# Patient Record
Sex: MALE | Race: BLACK OR AFRICAN AMERICAN
[De-identification: names, ages, dates, MRNs, and addresses within clinical notes are randomized per-mention and may not be internally consistent; named-entity substitution may affect disease eponyms.]

---

## 2019-09-14 ENCOUNTER — HOSPITAL ENCOUNTER (EMERGENCY)
Dept: HOSPITAL 62 - ER | Age: 24
LOS: 1 days | Discharge: ANOTHER HOSPITAL | End: 2019-09-15
Payer: SELF-PAY

## 2019-09-14 DIAGNOSIS — R20.2: ICD-10-CM

## 2019-09-14 DIAGNOSIS — R94.31: ICD-10-CM

## 2019-09-14 DIAGNOSIS — R06.02: ICD-10-CM

## 2019-09-14 DIAGNOSIS — R07.89: Primary | ICD-10-CM

## 2019-09-14 DIAGNOSIS — F12.10: ICD-10-CM

## 2019-09-14 DIAGNOSIS — R11.0: ICD-10-CM

## 2019-09-14 DIAGNOSIS — I10: ICD-10-CM

## 2019-09-14 DIAGNOSIS — R79.89: ICD-10-CM

## 2019-09-14 LAB
ADD MANUAL DIFF: NO
ALBUMIN SERPL-MCNC: 4.4 G/DL (ref 3.5–5)
ALP SERPL-CCNC: 79 U/L (ref 38–126)
ANION GAP SERPL CALC-SCNC: 7 MMOL/L (ref 5–19)
AST SERPL-CCNC: 21 U/L (ref 17–59)
BARBITURATES UR QL SCN: NEGATIVE
BASOPHILS # BLD AUTO: 0 10^3/UL (ref 0–0.2)
BASOPHILS NFR BLD AUTO: 1 % (ref 0–2)
BILIRUB DIRECT SERPL-MCNC: 0.1 MG/DL (ref 0–0.4)
BILIRUB SERPL-MCNC: 0.5 MG/DL (ref 0.2–1.3)
BUN SERPL-MCNC: 13 MG/DL (ref 7–20)
CALCIUM: 9.8 MG/DL (ref 8.4–10.2)
CHLORIDE SERPL-SCNC: 102 MMOL/L (ref 98–107)
CO2 SERPL-SCNC: 31 MMOL/L (ref 22–30)
EOSINOPHIL # BLD AUTO: 0.2 10^3/UL (ref 0–0.6)
EOSINOPHIL NFR BLD AUTO: 5.1 % (ref 0–6)
ERYTHROCYTE [DISTWIDTH] IN BLOOD BY AUTOMATED COUNT: 12.6 % (ref 11.5–14)
GLUCOSE SERPL-MCNC: 96 MG/DL (ref 75–110)
HCT VFR BLD CALC: 39.6 % (ref 37.9–51)
HGB BLD-MCNC: 13.7 G/DL (ref 13.5–17)
LYMPHOCYTES # BLD AUTO: 1.7 10^3/UL (ref 0.5–4.7)
LYMPHOCYTES NFR BLD AUTO: 34.3 % (ref 13–45)
MCH RBC QN AUTO: 30.8 PG (ref 27–33.4)
MCHC RBC AUTO-ENTMCNC: 34.5 G/DL (ref 32–36)
MCV RBC AUTO: 89 FL (ref 80–97)
METHADONE UR QL SCN: NEGATIVE
MONOCYTES # BLD AUTO: 0.3 10^3/UL (ref 0.1–1.4)
MONOCYTES NFR BLD AUTO: 7.2 % (ref 3–13)
NEUTROPHILS # BLD AUTO: 2.5 10^3/UL (ref 1.7–8.2)
NEUTS SEG NFR BLD AUTO: 52.4 % (ref 42–78)
PCP UR QL SCN: NEGATIVE
PLATELET # BLD: 213 10^3/UL (ref 150–450)
POTASSIUM SERPL-SCNC: 4 MMOL/L (ref 3.6–5)
PROT SERPL-MCNC: 7.1 G/DL (ref 6.3–8.2)
RBC # BLD AUTO: 4.44 10^6/UL (ref 4.35–5.55)
TOTAL CELLS COUNTED % (AUTO): 100 %
URINE AMPHETAMINES SCREEN: NEGATIVE
URINE BENZODIAZEPINES SCREEN: NEGATIVE
URINE COCAINE SCREEN: NEGATIVE
URINE MARIJUANA (THC) SCREEN: (no result)
WBC # BLD AUTO: 4.8 10^3/UL (ref 4–10.5)

## 2019-09-14 PROCEDURE — 85025 COMPLETE CBC W/AUTO DIFF WBC: CPT

## 2019-09-14 PROCEDURE — 36415 COLL VENOUS BLD VENIPUNCTURE: CPT

## 2019-09-14 PROCEDURE — 80307 DRUG TEST PRSMV CHEM ANLYZR: CPT

## 2019-09-14 PROCEDURE — 84484 ASSAY OF TROPONIN QUANT: CPT

## 2019-09-14 PROCEDURE — 71046 X-RAY EXAM CHEST 2 VIEWS: CPT

## 2019-09-14 PROCEDURE — 93010 ELECTROCARDIOGRAM REPORT: CPT

## 2019-09-14 PROCEDURE — 93005 ELECTROCARDIOGRAM TRACING: CPT

## 2019-09-14 PROCEDURE — 80053 COMPREHEN METABOLIC PANEL: CPT

## 2019-09-14 PROCEDURE — 99285 EMERGENCY DEPT VISIT HI MDM: CPT

## 2019-09-14 RX ADMIN — NITROGLYCERIN PRN TAB: 0.4 TABLET SUBLINGUAL at 22:25

## 2019-09-14 RX ADMIN — NITROGLYCERIN PRN TAB: 0.4 TABLET SUBLINGUAL at 22:06

## 2019-09-14 NOTE — RADIOLOGY REPORT (SQ)
EXAM DESCRIPTION:

XR CHEST 2 VIEWS



COMPLETED DATE/TME:  09/14/2019 22:52



CLINICAL HISTORY:

24 years Male, chest pain



COMPARISON:

None.



NUMBER OF VIEWS/TECHNIQUE:

2, Frontal, Lateral



FINDINGS:



Adequate lung volume, clear parenchyma, normal cardiac

silhouette, and intact bony thorax.



IMPRESSION:



No acute cardiopulmonary findings.

## 2019-09-14 NOTE — ER DOCUMENT REPORT
ED Cardiac





- General


Chief Complaint: Chest Pressure


Stated Complaint: CHEST PAIN


Time Seen by Provider: 09/14/19 21:34


Primary Care Provider: 


RAMON MONTERO [NO LOCAL MD] - Follow up as needed


Mode of Arrival: Ambulatory


Information source: Patient


Notes: 





Patient is an otherwise healthy 24-year-old male presenting to the emergency 

department chief complaint of chest tightness that initially began 3 weeks ago. 

He reports it is an intermittent tightness and is not associated with any 

activity.  Today after playing basketball he experience another episode of the 

chest tightness with radiation straight through to his back in approximately 6 

PM.  He also reported some altered sensation in the right arm and right neck, he

stated it felt like a pins and needle sensation.  He reports associated nausea 

and shortness of breath with episodes of chest pressure.  He has no past medical

history, he does not take any daily medications, he does report a history of an 

umbilical hernia repair several years ago.  He is a non-smoker but reports use 

of marijuana.


TRAVEL OUTSIDE OF THE [place] IN LAST 30 DAYS: No





- Related Data


Allergies/Adverse Reactions: 


                                        





No Known Allergies Allergy (Unverified 01/18/12 17:09)


   











Past Medical History





- General


Information source: Patient





- Social History


Smoking Status: Never Smoker


Frequency of alcohol use: Occasional


Drug Abuse: Marijuana.  denies: Cocaine


Lives with: Family


Family History: Reviewed & Not Pertinent





- Medical History


Medical History: Negative


Past Surgical History: Reports: Other - Umbilical hernia repair





- Immunizations


Immunizations up to date: Yes


Hx Diphtheria, Pertussis, Tetanus Vaccination: Yes





Review of Systems





- Review of Systems


Constitutional: No symptoms reported


EENT: No symptoms reported


Cardiovascular: Chest pain.  denies: Palpitations


Respiratory: Short of breath


Gastrointestinal: Nausea


Genitourinary: No symptoms reported


Male Genitourinary: No symptoms reported


Musculoskeletal: See HPI


Skin: No symptoms reported


Hematologic/Lymphatic: No symptoms reported


Neurological/Psychological: No symptoms reported





Physical Exam





- Vital signs


Vitals: 


                                        











Resp


 


 11 L


 


 09/14/19 21:37














- Notes


Notes: 





PHYSICAL EXAMINATION:





GENERAL: Well-appearing, well-nourished and in no acute distress.





HEAD: Atraumatic, normocephalic.





EYES: Pupils equal round and reactive to light, extraocular movements intact, 

sclera anicteric, conjunctiva are normal.





ENT: Nares patent, oropharynx clear without exudates.  Moist mucous membranes.





NECK: Normal range of motion, supple without lymphadenopathy





LUNGS: Breath sounds clear to auscultation bilaterally and equal.  No wheezes 

rales or rhonchi.





HEART: Regular rate and rhythm without murmurs





ABDOMEN: Soft, nontender, nondistended abdomen.  No guarding, no rebound.  No 

masses appreciated.





Musculoskeletal: Normal range of motion, no pitting or edema.  No cyanosis.





NEUROLOGICAL: Cranial nerves grossly intact.  Normal speech, normal gait.  

Normal sensory, motor exams 





PSYCH: Normal mood, normal affect.





SKIN: Warm, Dry, normal turgor, no rashes or lesions noted.





Course





- Re-evaluation


Re-evalutation: 





09/14/19 21:54


Patient appears well, nontoxic is alert, oriented and in no acute distress.  

Patient's EKG shows a sinus rhythm, rate of 67 with some anterior ST elevation, 

most likely LVH however there is no comparison on file.  He does have a 

reasonable story with his chest pain however he has no cardiac history and no 

cardiac risk factors.  Due to his abnormal EKG we will proceed at this time with

a full cardiac work-up.  Patient's physical examination is unremarkable, he is 

laughing and smiling during my exam although he does report a chest pressure of 

4/5.





09/14/19 21:57


Nitroglycerin ordered, nursing staff will monitor patient's chest pain level.  

We will also inquire again about any cocaine use, patient denied this to me but 

his mother was present in the room.





09/14/19 22:55


Drug screen is negative.  First troponin is mildly elevated at 0.032.  Repeat 

EKG ordered.  Patient does report improvement of his pain after 2 doses of 

nitroglycerin.  He states he still feels a slight pressure but it has decreased 

from a 4/5 down to a 2/5.  I did discuss the case with my attending physician.  

We will continue to monitor patient and will draw a repeat troponin.  Patient 

was initially hypertensive on arrival with a blood pressure systolically 160.  

Patient denies any history of hypertension but as I did look through his chart 

he was seen here in 2012 and also had hypertension at that time.  He has never 

been formally diagnosed with hypertension.





09/15/19 02:25


Repeat troponin came back at 0.041 which is elevated from previous.  I did call 

our cardiologist, Dr. Nugent as patient is currently chest pain-free.  He 

does believe this patient needs to be transferred to a tertiary facility for 

possible cardiac cath.  Call was then placed to [facility].  Currently awaiting callback.  Again patient is currently chest pain-

free, his blood pressure is 130/74, he is breathing 11 times per minute, his 

pulse ox is 99% on room air and his heart rate is ranging between 49-56.





09/15/19 03:23


Patient accepted for transfer to [facility].  I spoke 

with Dr. Martinez who is excepting the patient on behalf of Dr. Vega Whitaker.  No 

additional orders were requested at this time.





09/15/19 05:50


Patient reevaluated at this time and is stable for transport.  Transport 

expected to be here in 10 minutes.





There is a delay in transport, nursing staff working on securing transport.  

Patient remained stable, vital signs within normal limits.





09/15/19 08:13


Transport is at bedside to transport patient to [facility].  Patient is chest pain-free at this time.  Patient stable for transport.








- Vital Signs


Vital signs: 


                                        











Temp Pulse Resp BP Pulse Ox


 


 98.5 F      14   140/83 H  100 


 


 09/15/19 08:00     09/15/19 08:00  09/15/19 08:00  09/15/19 08:00














- Laboratory


Result Diagrams: 


                                 09/14/19 21:40





                                 09/14/19 21:40


Laboratory results interpreted by me: 


                                        











  09/14/19





  21:40


 


Carbon Dioxide  31 H














Discharge





- Discharge


Clinical Impression: 


 Elevated troponin, Abnormal EKG





Chest pain


Qualifiers:


 Chest pain type: unspecified Qualified Code(s): R07.9 - Chest pain, unspecified





Condition: Stable


Disposition: [facility]


Forms:  Treatment of Relative/Child


Referrals: 


LOCALMD,NO [NO LOCAL MD] - Follow up as needed

## 2019-09-15 VITALS — DIASTOLIC BLOOD PRESSURE: 83 MMHG | SYSTOLIC BLOOD PRESSURE: 140 MMHG

## 2019-09-15 NOTE — EKG REPORT
SEVERITY:- ABNORMAL ECG -

SINUS BRADYCARDIA

CONSIDER LEFT VENTRICULAR HYPERTROPHY

ABNORMAL T, PROBABLE ISCHEMIA, ANT-LAT LEADS

ANTERIOR ST ELEVATION, PROBABLY DUE TO LVH

:

Confirmed by: Jaz Nugent MD 15-Sep-2019 10:22:52

## 2019-09-15 NOTE — EKG REPORT
SEVERITY:- ABNORMAL ECG -

SINUS RHYTHM

ABNORMAL T, PROBABLE ISCHEMIA, INFERIOR LEADS

BORDERLINE ST ELEVATION, ANTERIOR LEADS

PROBABLE LVH W/ SECONDARY REPOL ABNORMALITIES

:

Confirmed by: Jaz Nugent MD 15-Sep-2019 10:24:08

## 2019-09-15 NOTE — EKG REPORT
SEVERITY:- ABNORMAL ECG -

SINUS RHYTHM

ATRIAL PREMATURE COMPLEX

PROBABLE LVH WITH SECONDARY REPOL ABNRM

ABNORMAL T, PROBABLE ISCHEMIA, ANT-LAT LEADS

ANTERIOR ST ELEVATION, PROBABLY DUE TO LVH

:

Confirmed by: Jaz Nugent MD 15-Sep-2019 10:24:14

## 2020-07-17 ENCOUNTER — HOSPITAL ENCOUNTER (EMERGENCY)
Dept: HOSPITAL 62 - ER | Age: 25
Discharge: HOME | End: 2020-07-17
Payer: COMMERCIAL

## 2020-07-17 VITALS — SYSTOLIC BLOOD PRESSURE: 152 MMHG | DIASTOLIC BLOOD PRESSURE: 87 MMHG

## 2020-07-17 DIAGNOSIS — R06.02: Primary | ICD-10-CM

## 2020-07-17 DIAGNOSIS — R07.89: ICD-10-CM

## 2020-07-17 LAB
ADD MANUAL DIFF: NO
ALBUMIN SERPL-MCNC: 4.5 G/DL (ref 3.5–5)
ALP SERPL-CCNC: 98 U/L (ref 38–126)
ANION GAP SERPL CALC-SCNC: 8 MMOL/L (ref 5–19)
AST SERPL-CCNC: 30 U/L (ref 17–59)
BASOPHILS # BLD AUTO: 0.1 10^3/UL (ref 0–0.2)
BASOPHILS NFR BLD AUTO: 1.1 % (ref 0–2)
BILIRUB DIRECT SERPL-MCNC: 0 MG/DL (ref 0–0.4)
BILIRUB SERPL-MCNC: 0.5 MG/DL (ref 0.2–1.3)
BUN SERPL-MCNC: 14 MG/DL (ref 7–20)
CALCIUM: 9.6 MG/DL (ref 8.4–10.2)
CHLORIDE SERPL-SCNC: 102 MMOL/L (ref 98–107)
CK MB SERPL-MCNC: 0.94 NG/ML (ref ?–4.55)
CK SERPL-CCNC: 240 U/L (ref 55–170)
CO2 SERPL-SCNC: 27 MMOL/L (ref 22–30)
EOSINOPHIL # BLD AUTO: 0.3 10^3/UL (ref 0–0.6)
EOSINOPHIL NFR BLD AUTO: 6.6 % (ref 0–6)
ERYTHROCYTE [DISTWIDTH] IN BLOOD BY AUTOMATED COUNT: 12.6 % (ref 11.5–14)
GLUCOSE SERPL-MCNC: 105 MG/DL (ref 75–110)
HCT VFR BLD CALC: 40.5 % (ref 37.9–51)
HGB BLD-MCNC: 14 G/DL (ref 13.5–17)
LYMPHOCYTES # BLD AUTO: 2.1 10^3/UL (ref 0.5–4.7)
LYMPHOCYTES NFR BLD AUTO: 44.4 % (ref 13–45)
MCH RBC QN AUTO: 31.5 PG (ref 27–33.4)
MCHC RBC AUTO-ENTMCNC: 34.7 G/DL (ref 32–36)
MCV RBC AUTO: 91 FL (ref 80–97)
MONOCYTES # BLD AUTO: 0.3 10^3/UL (ref 0.1–1.4)
MONOCYTES NFR BLD AUTO: 7.2 % (ref 3–13)
NEUTROPHILS # BLD AUTO: 1.9 10^3/UL (ref 1.7–8.2)
NEUTS SEG NFR BLD AUTO: 40.7 % (ref 42–78)
PLATELET # BLD: 238 10^3/UL (ref 150–450)
POTASSIUM SERPL-SCNC: 4.3 MMOL/L (ref 3.6–5)
PROT SERPL-MCNC: 7.5 G/DL (ref 6.3–8.2)
RBC # BLD AUTO: 4.45 10^6/UL (ref 4.35–5.55)
TOTAL CELLS COUNTED % (AUTO): 100 %
TROPONIN I SERPL-MCNC: 0.04 NG/ML
WBC # BLD AUTO: 4.6 10^3/UL (ref 4–10.5)

## 2020-07-17 PROCEDURE — 85379 FIBRIN DEGRADATION QUANT: CPT

## 2020-07-17 PROCEDURE — 36415 COLL VENOUS BLD VENIPUNCTURE: CPT

## 2020-07-17 PROCEDURE — 80053 COMPREHEN METABOLIC PANEL: CPT

## 2020-07-17 PROCEDURE — 82550 ASSAY OF CK (CPK): CPT

## 2020-07-17 PROCEDURE — 76705 ECHO EXAM OF ABDOMEN: CPT

## 2020-07-17 PROCEDURE — 93005 ELECTROCARDIOGRAM TRACING: CPT

## 2020-07-17 PROCEDURE — 84484 ASSAY OF TROPONIN QUANT: CPT

## 2020-07-17 PROCEDURE — 82553 CREATINE MB FRACTION: CPT

## 2020-07-17 PROCEDURE — 71275 CT ANGIOGRAPHY CHEST: CPT

## 2020-07-17 PROCEDURE — 71046 X-RAY EXAM CHEST 2 VIEWS: CPT

## 2020-07-17 PROCEDURE — 85025 COMPLETE CBC W/AUTO DIFF WBC: CPT

## 2020-07-17 PROCEDURE — 93010 ELECTROCARDIOGRAM REPORT: CPT

## 2020-07-17 PROCEDURE — 99285 EMERGENCY DEPT VISIT HI MDM: CPT

## 2020-07-17 NOTE — RADIOLOGY REPORT (SQ)
EXAM DESCRIPTION:  U/S ABDOMEN LIMITED W/O DOP



IMAGES COMPLETED DATE/TIME:  7/17/2020 12:28 pm



REASON FOR STUDY:  Epigastric chest pain



COMPARISON:  None.



TECHNIQUE:  Dynamic and static grayscale images acquired of the abdomen and recorded on PACS. Additio
nal selected color Doppler and spectral images recorded.



LIMITATIONS:  None.



FINDINGS:  PANCREAS: No masses.  Visualized pancreatic duct normal caliber.

LIVER: No masses. Echotexture normal.

LIVER VASCULATURE: Normal directional flow of the main portal vein and hepatic veins.

GALLBLADDER: No stones. Normal wall thickness. No pericholecystic fluid.

ULTRASOUND-DETECTED OLMSTEAD'S SIGN: Negative.

INTRAHEPATIC DUCTS AND COMMON DUCT: CBD and intrahepatic ducts normal caliber. No filling defects.

AORTA: No aneurysm.

RIGHT KIDNEY:  Normal size. Normal echogenicity. No solid or suspicious masses. No hydronephrosis. No
 calcifications.

PERITONEAL AND RIGHT PLEURAL SPACE: No ascites or effusions.

OTHER: No other significant findings.



IMPRESSION:  NORMAL RIGHT UPPER QUADRANT ULTRASOUND.



TECHNICAL DOCUMENTATION:  JOB ID:  [id number]

 2011 [employer]- All Rights Reserved



Reading location - IP/workstation name: [email or web address]

## 2020-07-17 NOTE — RADIOLOGY REPORT (SQ)
EXAM DESCRIPTION: 



XR CHEST 2 VIEWS



COMPLETED DATE/TME:  07/17/2020 00:00



CLINICAL HISTORY: 



25 years, Male, chest pain



COMPARISON:

9/14/2019



NUMBER OF VIEWS:

Two



TECHNIQUE:

Two views of the chest



LIMITATIONS:

None.



FINDINGS:



The lungs are clear. The heart is normal in size. There is no

pneumothorax or pleural effusion. There is no acute fracture.



IMPRESSION:



No acute cardiopulmonary abnormality.

 



copyright 2011 [employer]- All Rights Reserved

## 2020-07-17 NOTE — ER DOCUMENT REPORT
Entered by RUTHY WONG SCRIBE  07/17/20 0903 





Acting as scribe for:RASHAUN MCKINLEY MD





ED General





- General


Chief Complaint: Shortness Of Breath


Stated Complaint: SHORTNESS OF BREATH


Time Seen by Provider: 07/17/20 08:46


Mode of Arrival: Ambulatory


Information source: Patient


Notes: 





This 25 year old male patient with idiopathic cardiomyopathy that presents to 

the emergency department today with complaints of waking up from sleep this 

morning at about 3:00 AM with complaints of chest tightness with associated 

shortness of breath. Patient was seen here on Sept 14 2019 for chest pain, he 

had an indeterminate troponin and was sent to [facility].  Patient had an echo 

which found cardiomyopathy.  Patient was sent home on a low-dose of metoprolol 

but he has not been taking this medication for the last month.





TRAVEL OUTSIDE OF THE [place] IN LAST 30 DAYS: No





- Related Data


Allergies/Adverse Reactions: 


                                        





No Known Allergies Allergy (Unverified 01/18/12 17:09)


   








Home Medications: metoprolol.  atorvastatin





Past Medical History





- General


Information source: Patient





- Social History


Smoking Status: Never Smoker


Cigarette use (# per day): No


Frequency of alcohol use: None


Drug Abuse: None


Occupation:  at [employer]


Family History: Reviewed & Not Pertinent


Patient has homicidal ideation: No





- Past Medical History


Cardiac Medical History: Reports: Other - idiopathic cardiomyopathy


Past Surgical History: Reports: Hx Herniorrhaphy - umbilical





- Immunizations


Immunizations up to date: Yes


Hx Diphtheria, Pertussis, Tetanus Vaccination: Yes





Review of Systems





- Review of Systems


Constitutional: No symptoms reported


EENT: No symptoms reported


Cardiovascular: See HPI, Chest pain


Respiratory: See HPI, Short of breath


Gastrointestinal: No symptoms reported


Genitourinary: No symptoms reported


Male Genitourinary: No symptoms reported


Musculoskeletal: No symptoms reported


Skin: No symptoms reported


Hematologic/Lymphatic: No symptoms reported


Neurological/Psychological: No symptoms reported


-: Yes All other systems reviewed and negative





Physical Exam





- Vital signs


Vitals: 


                                        











Pulse Resp BP Pulse Ox


 


 51 L  18   147/93 H  100 


 


 07/17/20 04:16  07/17/20 04:16  07/17/20 04:16  07/17/20 04:16














- Notes


Notes: 





Physical Exam:


 


General: Alert, appears well. 


 


HEENT: Normocephalic. Atraumatic. PERRL. Extraocular movements intact. 

Oropharynx clear.


 


Neck: Supple. Non-tender.


 


Respiratory: No respiratory distress. Clear and equal breath sounds bilaterally.


 


Cardiovascular: Regular rate and rhythm. 


 


Abdominal: Normal Inspection. Non-tender. No distension. Normal Bowel Sounds. 


 


Back: No gross abnormalities. 


 


Extremities: Moves all four extremities.


Upper extremities: Normal inspection. Normal ROM.  


Lower extremities: Normal inspection. No edema. Normal ROM.


 


Neurological: Normal cognition. AAOx4. Normal speech.  


 


Psychological: Normal affect. Normal Mood. 


 


Skin: Warm. Dry. Normal color.





Course





- Re-evaluation


Re-evalutation: 





07/17/20 10:47


Patient's d-dimer was elevated at 0.84, CTA chest was done due to the patient's 

reported symptoms and the elevated d-dimer.  CTA chest was unremarkable.


Serial troponins were 0.043 and 0.044


Serial troponins done on 9/14/2019 were around 0.040, so the troponins today are

unchanged from what was recorded on his last visit.














- Vital Signs


Vital signs: 


                                        











Temp Pulse Resp BP Pulse Ox


 


 98 F   51 L  14   128/89 H  100 


 


 07/17/20 11:49  07/17/20 04:16  07/17/20 10:00  07/17/20 05:01  07/17/20 10:00














- Laboratory


Result Diagrams: 


                                 07/17/20 04:42





                                 07/17/20 04:42


Laboratory results interpreted by me: 


                                        











  07/17/20 07/17/20 07/17/20





  04:42 04:42 04:42


 


Eos % (Auto)  6.6 H  


 


Seg Neutrophils %  40.7 L  


 


D-Dimer    0.84 H


 


Sodium   136.6 L 


 


Creatine Kinase   240 H 














- Diagnostic Test


Radiology reviewed: Image reviewed, Reports reviewed - CTA chest does not show 

pulmonary emboli or other abnormalities. Gallbladder ultrasound is unremarkable.





- EKG Interpretation by Me


EKG shows normal: Sinus rhythm, Axis, Intervals, QRS Complexes.  abnormal: ST-T 

Waves - Anterior ST elevation due to LVH, anterolateral repolarization 

abnormality


Rate: Bradycardia - 47


Voltage: Consistant with LVH


When compared to previous EKG there are: No significant change - Compared to the

EKGs done on 9/14/2019





Discharge





- Discharge


Clinical Impression: 


 Tightness in chest, Shortness of breath





Condition: Stable


Disposition: HOME, SELF-CARE


Additional Instructions: 


Chest Pain of Unclear Cause





   The exact cause of your chest pain isn't clear. Fortunately, there is no 

evidence of a dangerous medical condition.  Further testing may be required to 

find the source of the pain.


   Most often, we find that this pain is coming from the chest wall -- the 

muscles or rib joints in the chest.  But chest pain can come from the lung and 

lung lining, the esophagus, the heart valves or heart lining, and even the 

stomach or gallbladder.


   Rest.  Eat lightly until the pain is gone.  We may prescribe medicine for 

pain and inflammation.


   You should call the physician immediately if the pain radiates to the 

shoulder, jaw or arms; if you start to run a fever or develop a cough; or if you

develop shortness of breath, or other new or alarming symptoms.








Dyspnea, Nonspecific





   You were evaluated for shortness of breath, or dyspnea. Dyspnea has many 

causes, and some are more serious than others. Sometimes it's impossible to 

diagnose the cause of dyspnea with the tests that are available on an emergency 

basis. Based on our evaluation today, you do not need hospitalization now. We 

found no evidence of pneumonia, collapsed lung, blood clots in the lung, tumors,

or heart failure.


     Causes of non-specific dyspnea can include asthma or bronchospasm, 

hyperventilation, emotional distress, heart disease, emphysema, fibrosis of the 

lung, and stiffness of the chest wall.


     In healthy individuals with a single episode, it's sometimes reasonable to 

do nothing but wait to see if the problem occurs again. Additional tests used to

evaluate dyspnea can include cardiac stress testing, echocardiography, pulmonary

function testing, CAT scan of the chest, bronchoscopy or pulmonary biopsy.


     Return if shortness of breath persists or worsens, or if you develop chest 

pain, fever, cough, confusion, or fainting.











**********************************************************

**************************************************************





No clear explanation for your symptoms of chest tightness and shortness of 

breath were found today.


Your EKG is unchanged from the ones done on September 14, 2019.


The CT scan of your chest, lungs, and large vessels was unremarkable.


The gallbladder ultrasound was normal.





You can take Tylenol for pain if needed.


Rest, do not exert yourself if you are feeling short of breath.


Follow-up with a local primary care provider Monday for recheck if not 

improving.





RETURN TO THE EMERGENCY ROOM IF ANY NEW OR WORSENING SYMPTOMS.











I personally performed the services described in the documentation, reviewed and

edited the documentation which was dictated to the scribe in my presence, and it

accurately records my words and actions.

## 2020-07-17 NOTE — EKG REPORT
SEVERITY:- ABNORMAL ECG -

SINUS BRADYCARDIA

CONSIDER LEFT VENTRICULAR HYPERTROPHY

REPOL ABNRM, PROBABLE ISCHEMIA, ANT-LAT LEADS

ANTERIOR ST ELEVATION

:

Confirmed by: Quintin Cooper MD 17-Jul-2020 13:06:06

## 2020-07-17 NOTE — RADIOLOGY REPORT (SQ)
EXAM DESCRIPTION:  CTA CHEST



IMAGES COMPLETED DATE/TIME:  7/17/2020 9:31 am



REASON FOR STUDY:  Elevated d-dimer,chest tight w/SOB,cardiomyopathy



COMPARISON:  None.



TECHNIQUE:  CT scan of the chest performed using helical scanning technique with dynamic intravenous 
contrast injection.  Images reviewed with lung, soft tissue and bone windows.  Reconstructed coronal 
and sagittal MPR images reviewed.

Additional 3 dimensional post-processing performed to develop Maximal Intensity Projection images (MI
P).  All images stored on PACS.

All CT scanners at this facility use dose modulation, iterative reconstruction, and/or weight based d
osing when appropriate to reduce radiation dose to as low as reasonably achievable (ALARA).

[facility]: Dose Right  [facility]: CareDose    [facility]: Dose Right    [facility]: Teradose 4D    [facility]: Smart Technologies



CONTRAST TYPE AND DOSE:  contrast/concentration: Isovue 350.00 mmol/ml; Total Contrast Delivered: 71.
0 ml; Total Saline Delivered: 80.0 ml

Contrast bolus optimized for the pulmonary arteries. Not diagnostic for the aorta.



RENAL FUNCTION:  BUN 14, creatinine 1.08



RADIATION DOSE:  CT Rad equipment meets quality standard of care and radiation dose reduction techniq
ues were employed. CTDIvol: 6.6 - 39.7 mGy. DLP: 636 mGy-cm. .



LIMITATIONS:  None.



FINDINGS:  LUNGS AND PLEURA: No masses, infiltrates, or pneumothorax.  No pleural effusions or pleura
l calcifications.

AORTA AND GREAT VESSELS: No aneurysm.  Contrast bolus not optimized for the aorta.

HEART: No pericardial effusion. No significant coronary artery calcifications.

PULMONARY ARTERIES: No emboli visualized in the main pulmonary arteries or the segmental branches.

HILAR AND MEDIASTINAL STRUCTURES: No identified masses or abnormal nodes.

HARDWARE: None in the chest.

UPPER ABDOMEN: No significant findings.  Limited exam.

THYROID AND OTHER SOFT TISSUES: Thyroid is unremarkable.  There is bilateral gynecomastia.

BONES: No acute or significant finding.

3D MIPS: Confirm above findings.

OTHER: No other significant finding.



IMPRESSION:  1. No pulmonary emboli.  No consolidation.

2.  Bilateral gynecomastia.



COMMENT:  Quality ID # [id number]: Final reports with documentation of one or more dose reduction techniques
 (e.g., Automated exposure control, adjustment of the mA and/or kV according to patient size, use of 
iterative reconstruction technique)



TECHNICAL DOCUMENTATION:  JOB ID:  [id number]

 [employer]- All Rights Reserved



Reading location - IP/workstation name: [email or web address]